# Patient Record
Sex: FEMALE | Race: WHITE | NOT HISPANIC OR LATINO | Employment: UNEMPLOYED | URBAN - METROPOLITAN AREA
[De-identification: names, ages, dates, MRNs, and addresses within clinical notes are randomized per-mention and may not be internally consistent; named-entity substitution may affect disease eponyms.]

---

## 2024-01-17 ENCOUNTER — APPOINTMENT (OUTPATIENT)
Dept: PRIMARY CARE | Facility: CLINIC | Age: 54
End: 2024-01-17
Payer: COMMERCIAL

## 2024-01-18 ENCOUNTER — OFFICE VISIT (OUTPATIENT)
Dept: PRIMARY CARE | Facility: CLINIC | Age: 54
End: 2024-01-18
Payer: COMMERCIAL

## 2024-01-18 VITALS
HEART RATE: 65 BPM | TEMPERATURE: 97 F | BODY MASS INDEX: 42.15 KG/M2 | DIASTOLIC BLOOD PRESSURE: 80 MMHG | HEIGHT: 64 IN | WEIGHT: 246.9 LBS | OXYGEN SATURATION: 98 % | SYSTOLIC BLOOD PRESSURE: 118 MMHG

## 2024-01-18 DIAGNOSIS — I42.8 NON-ISCHEMIC CARDIOMYOPATHY (MULTI): ICD-10-CM

## 2024-01-18 DIAGNOSIS — I47.29 NSVT (NONSUSTAINED VENTRICULAR TACHYCARDIA) (MULTI): ICD-10-CM

## 2024-01-18 DIAGNOSIS — G44.89 HEADACHE SYNDROME: Primary | ICD-10-CM

## 2024-01-18 PROBLEM — D18.01 HEMANGIOMA OF SKIN AND SUBCUTANEOUS TISSUE: Status: ACTIVE | Noted: 2017-09-21

## 2024-01-18 PROBLEM — I47.10 SUPRAVENTRICULAR TACHYCARDIA (CMS-HCC): Status: ACTIVE | Noted: 2018-01-04

## 2024-01-18 PROBLEM — R05.3 PERSISTENT COUGH: Status: RESOLVED | Noted: 2024-01-18 | Resolved: 2024-01-18

## 2024-01-18 PROBLEM — M75.40 IMPINGEMENT SYNDROME, SHOULDER: Status: ACTIVE | Noted: 2024-01-18

## 2024-01-18 PROBLEM — I49.9 IRREGULAR HEARTBEAT: Status: ACTIVE | Noted: 2024-01-18

## 2024-01-18 PROBLEM — I49.3 FREQUENT PVCS: Status: ACTIVE | Noted: 2023-11-08

## 2024-01-18 PROBLEM — M54.2 NECK PAIN, ACUTE: Status: RESOLVED | Noted: 2024-01-18 | Resolved: 2024-01-18

## 2024-01-18 PROBLEM — I49.9 ARRHYTHMIA: Status: RESOLVED | Noted: 2017-12-20 | Resolved: 2024-01-18

## 2024-01-18 PROBLEM — I50.22 CHRONIC SYSTOLIC HEART FAILURE (MULTI): Status: RESOLVED | Noted: 2024-01-18 | Resolved: 2024-01-18

## 2024-01-18 PROBLEM — B00.9 HERPESVIRAL INFECTION, UNSPECIFIED: Status: ACTIVE | Noted: 2017-09-21

## 2024-01-18 PROBLEM — E66.01 MORBID OBESITY (MULTI): Status: ACTIVE | Noted: 2024-01-18
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
## 2024-01-18 PROBLEM — N92.0 MENORRHAGIA: Status: RESOLVED | Noted: 2024-01-18 | Resolved: 2024-01-18

## 2024-01-18 PROBLEM — I50.21 ACUTE SYSTOLIC HEART FAILURE (MULTI): Status: RESOLVED | Noted: 2017-12-20 | Resolved: 2024-01-18

## 2024-01-18 PROBLEM — J34.0 CELLULITIS OF EXTERNAL NOSE: Status: RESOLVED | Noted: 2024-01-18 | Resolved: 2024-01-18

## 2024-01-18 PROBLEM — I51.7 LEFT VENTRICULAR DILATATION: Status: ACTIVE | Noted: 2017-12-20

## 2024-01-18 PROBLEM — L81.4 OTHER MELANIN HYPERPIGMENTATION: Status: ACTIVE | Noted: 2017-09-21

## 2024-01-18 PROBLEM — D23.9 OTHER BENIGN NEOPLASM OF SKIN, UNSPECIFIED: Status: ACTIVE | Noted: 2017-09-21

## 2024-01-18 PROBLEM — I10 BENIGN ESSENTIAL HYPERTENSION: Status: ACTIVE | Noted: 2024-01-18

## 2024-01-18 PROBLEM — R55 NEAR SYNCOPE: Status: RESOLVED | Noted: 2017-12-12 | Resolved: 2024-01-18

## 2024-01-18 PROBLEM — N93.9 ABNORMAL UTERINE BLEEDING (AUB): Status: RESOLVED | Noted: 2024-01-18 | Resolved: 2024-01-18

## 2024-01-18 PROBLEM — S16.1XXA NECK STRAIN: Status: RESOLVED | Noted: 2024-01-18 | Resolved: 2024-01-18

## 2024-01-18 PROCEDURE — 99213 OFFICE O/P EST LOW 20 MIN: CPT | Performed by: INTERNAL MEDICINE

## 2024-01-18 PROCEDURE — 3074F SYST BP LT 130 MM HG: CPT | Performed by: INTERNAL MEDICINE

## 2024-01-18 PROCEDURE — 3079F DIAST BP 80-89 MM HG: CPT | Performed by: INTERNAL MEDICINE

## 2024-01-18 PROCEDURE — 1036F TOBACCO NON-USER: CPT | Performed by: INTERNAL MEDICINE

## 2024-01-18 RX ORDER — NORETHINDRONE 5 MG/1
TABLET ORAL
COMMUNITY
Start: 2023-11-08

## 2024-01-18 RX ORDER — VALACYCLOVIR HYDROCHLORIDE 1 G/1
TABLET, FILM COATED ORAL 2 TIMES DAILY
COMMUNITY
Start: 2019-08-01

## 2024-01-18 RX ORDER — FLUTICASONE PROPIONATE 50 MCG
1 SPRAY, SUSPENSION (ML) NASAL 2 TIMES DAILY
COMMUNITY
Start: 2023-09-12

## 2024-01-18 RX ORDER — MINERAL OIL
180 ENEMA (ML) RECTAL
COMMUNITY
Start: 2023-09-12

## 2024-01-18 RX ORDER — AMITRIPTYLINE HYDROCHLORIDE 10 MG/1
10 TABLET, FILM COATED ORAL NIGHTLY
Qty: 90 TABLET | Refills: 3 | Status: SHIPPED | OUTPATIENT
Start: 2024-01-18 | End: 2025-01-17

## 2024-01-18 RX ORDER — LISINOPRIL 2.5 MG/1
2.5 TABLET ORAL
COMMUNITY
Start: 2022-12-22

## 2024-01-18 RX ORDER — AMITRIPTYLINE HYDROCHLORIDE 10 MG/1
1 TABLET, FILM COATED ORAL NIGHTLY
COMMUNITY
Start: 2020-11-09 | End: 2024-01-18 | Stop reason: SDUPTHER

## 2024-01-18 RX ORDER — METOPROLOL SUCCINATE 100 MG/1
100 TABLET, EXTENDED RELEASE ORAL
COMMUNITY
Start: 2022-12-22

## 2024-01-18 ASSESSMENT — ENCOUNTER SYMPTOMS
SHORTNESS OF BREATH: 0
DIARRHEA: 0
HEADACHES: 1
ABDOMINAL PAIN: 0
COUGH: 0
CONFUSION: 0
FEVER: 0
CHILLS: 0
CONSTIPATION: 0
LIGHT-HEADEDNESS: 0
DIZZINESS: 0
BACK PAIN: 0
ARTHRALGIAS: 0
VOMITING: 0
NAUSEA: 0
FATIGUE: 0

## 2024-01-18 ASSESSMENT — PATIENT HEALTH QUESTIONNAIRE - PHQ9
SUM OF ALL RESPONSES TO PHQ9 QUESTIONS 1 AND 2: 0
1. LITTLE INTEREST OR PLEASURE IN DOING THINGS: NOT AT ALL
2. FEELING DOWN, DEPRESSED OR HOPELESS: NOT AT ALL

## 2024-01-18 NOTE — PROGRESS NOTES
"Subjective   Patient ID: Erickson Hollins is a 53 y.o. female who presents for Headache.    HPI     History reviewed and updated.  Follows with cardiologist regularly - labs through cardiologist.  Sees GYN for mammogram.  Needs refill today on amitriptyline, helps with her headaches.     Review of Systems   Constitutional:  Negative for chills, fatigue and fever.   Respiratory:  Negative for cough and shortness of breath.    Cardiovascular:  Negative for chest pain and leg swelling.   Gastrointestinal:  Negative for abdominal pain, constipation, diarrhea, nausea and vomiting.   Musculoskeletal:  Negative for arthralgias, back pain and gait problem.   Skin:  Negative for rash.   Neurological:  Positive for headaches. Negative for dizziness and light-headedness.   Psychiatric/Behavioral:  Negative for confusion.        Objective   /80   Pulse 65   Temp 36.1 °C (97 °F)   Ht 1.626 m (5' 4\")   Wt 112 kg (246 lb 14.4 oz)   SpO2 98%   BMI 42.38 kg/m²     Physical Exam  Constitutional:       Appearance: Normal appearance. She is obese.   HENT:      Head: Normocephalic and atraumatic.   Cardiovascular:      Rate and Rhythm: Normal rate and regular rhythm.      Heart sounds: No murmur heard.  Pulmonary:      Effort: Pulmonary effort is normal. No respiratory distress.      Breath sounds: Normal breath sounds. No wheezing.   Musculoskeletal:      Right lower leg: No edema.      Left lower leg: No edema.   Skin:     Findings: No rash.   Neurological:      General: No focal deficit present.      Mental Status: She is alert and oriented to person, place, and time. Mental status is at baseline.   Psychiatric:         Mood and Affect: Mood normal.         Behavior: Behavior normal.         Thought Content: Thought content normal.         Judgment: Judgment normal.         Assessment/Plan   Problem List Items Addressed This Visit             ICD-10-CM    Headache syndrome - Primary G44.89    Relevant Medications    " amitriptyline (Elavil) 10 mg tablet    Non-ischemic cardiomyopathy (CMS/HCC) I42.8    Relevant Medications    metoprolol succinate XL (Toprol-XL) 100 mg 24 hr tablet    NSVT (nonsustained ventricular tachycardia) (CMS/HCC) I47.29    Relevant Medications    metoprolol succinate XL (Toprol-XL) 100 mg 24 hr tablet     Headache syndrome - doing well on amitriptyline, continue at same dose, Rx sent.    Nonischemic cardiomyopathy, hx NSVT - follows regularly with cardiologist.    Patient states up to date with mammogram, eye exams, dental exams.    Follow-up annually and PRN.

## 2024-10-14 DIAGNOSIS — G44.89 HEADACHE SYNDROME: ICD-10-CM

## 2024-10-15 RX ORDER — AMITRIPTYLINE HYDROCHLORIDE 10 MG/1
10 TABLET, FILM COATED ORAL NIGHTLY
Qty: 90 TABLET | Refills: 3 | Status: SHIPPED | OUTPATIENT
Start: 2024-10-15

## 2025-01-16 ENCOUNTER — APPOINTMENT (OUTPATIENT)
Dept: PRIMARY CARE | Facility: CLINIC | Age: 55
End: 2025-01-16
Payer: COMMERCIAL

## 2025-01-16 VITALS
WEIGHT: 241.2 LBS | SYSTOLIC BLOOD PRESSURE: 109 MMHG | DIASTOLIC BLOOD PRESSURE: 76 MMHG | TEMPERATURE: 97.1 F | HEART RATE: 76 BPM | HEIGHT: 64 IN | BODY MASS INDEX: 41.18 KG/M2

## 2025-01-16 DIAGNOSIS — G44.89 HEADACHE SYNDROME: Primary | ICD-10-CM

## 2025-01-16 DIAGNOSIS — I47.10 SUPRAVENTRICULAR TACHYCARDIA (CMS-HCC): ICD-10-CM

## 2025-01-16 DIAGNOSIS — J34.89 RHINORRHEA: ICD-10-CM

## 2025-01-16 DIAGNOSIS — I42.8 NON-ISCHEMIC CARDIOMYOPATHY (MULTI): ICD-10-CM

## 2025-01-16 PROCEDURE — 3074F SYST BP LT 130 MM HG: CPT | Performed by: INTERNAL MEDICINE

## 2025-01-16 PROCEDURE — 3008F BODY MASS INDEX DOCD: CPT | Performed by: INTERNAL MEDICINE

## 2025-01-16 PROCEDURE — 99213 OFFICE O/P EST LOW 20 MIN: CPT | Performed by: INTERNAL MEDICINE

## 2025-01-16 PROCEDURE — 3078F DIAST BP <80 MM HG: CPT | Performed by: INTERNAL MEDICINE

## 2025-01-16 PROCEDURE — 1036F TOBACCO NON-USER: CPT | Performed by: INTERNAL MEDICINE

## 2025-01-16 RX ORDER — AMITRIPTYLINE HYDROCHLORIDE 10 MG/1
10 TABLET, FILM COATED ORAL NIGHTLY
Qty: 90 TABLET | Refills: 3 | Status: SHIPPED | OUTPATIENT
Start: 2025-01-16

## 2025-01-16 RX ORDER — ASPIRIN 325 MG
TABLET, DELAYED RELEASE (ENTERIC COATED) ORAL
COMMUNITY
Start: 2024-09-02

## 2025-01-16 ASSESSMENT — ENCOUNTER SYMPTOMS
CONFUSION: 0
LIGHT-HEADEDNESS: 0
COUGH: 0
FATIGUE: 0
DIZZINESS: 0
VOMITING: 0
RHINORRHEA: 1
NAUSEA: 0
CHILLS: 0
ARTHRALGIAS: 0
SHORTNESS OF BREATH: 0
BACK PAIN: 0
FEVER: 0
ABDOMINAL PAIN: 0
CONSTIPATION: 0
HEADACHES: 1
DIARRHEA: 0

## 2025-01-16 ASSESSMENT — PATIENT HEALTH QUESTIONNAIRE - PHQ9
SUM OF ALL RESPONSES TO PHQ9 QUESTIONS 1 AND 2: 0
2. FEELING DOWN, DEPRESSED OR HOPELESS: NOT AT ALL
1. LITTLE INTEREST OR PLEASURE IN DOING THINGS: NOT AT ALL

## 2025-01-16 NOTE — PROGRESS NOTES
CHIEF COMPLAINT  Headache    HISTORY OF PRESENT ILLNESS  Erickson Hollins is a 54 y.o. female presents today for follow up of Headache    HPI    History reviewed and updated.  Needs refill on amitriptyline for headaches - continues to help.  Sees her cardiologist annually - he orders labs for her.  Only other change is chronic rhinorrhea over the last 6 months or so.   No congestion, sneezing, itching.    REVIEW OF SYSTEMS  Review of Systems   Constitutional:  Negative for chills, fatigue and fever.   HENT:  Positive for rhinorrhea.    Respiratory:  Negative for cough and shortness of breath.    Cardiovascular:  Negative for chest pain and leg swelling.   Gastrointestinal:  Negative for abdominal pain, constipation, diarrhea, nausea and vomiting.   Musculoskeletal:  Negative for arthralgias, back pain and gait problem.   Skin:  Negative for rash.   Neurological:  Positive for headaches. Negative for dizziness and light-headedness.   Psychiatric/Behavioral:  Negative for confusion.        ALLERGIES  Patient has no known allergies.    MEDICATIONS  Current Outpatient Medications   Medication Instructions    amitriptyline (ELAVIL) 10 mg, oral, Nightly    cholecalciferol (Vitamin D-3) 50,000 unit capsule TAKE 1 CAPSULE BY MOUTH ON A MONDAY FOR 12 WEEKS    fluticasone (Flonase) 50 mcg/actuation nasal spray 1 spray, 2 times daily    lisinopril 2.5 mg    metoprolol succinate XL (TOPROL-XL) 100 mg    norethindrone (Aygestin) 5 mg tablet 1 tabs, ORAL, DAILY, 90 tabs, 90, Date: 11/8/23 8:50:00 AM EST, [facility], 1 tabs ORAL DAILY    valACYclovir (Valtrex) 1 gram tablet 2 times daily       TOBACCO USE  Social History     Tobacco Use   Smoking Status Never   Smokeless Tobacco Never       DEPRESSION SCREEN  Over the past 2 weeks, how often have you been bothered by any of the following problems?  Little interest or pleasure in doing things: Not at all  Feeling down, depressed, or hopeless: Not at all    SURGICAL  "HISTORY  Past Surgical History:  01/27/2021: OTHER SURGICAL HISTORY      Comment:  Complete colonoscopy       OBJECTIVE    /76   Pulse 76   Temp 36.2 °C (97.1 °F)   Ht 1.626 m (5' 4\")   Wt 109 kg (241 lb 3.2 oz)   BMI 41.40 kg/m²    BMI: Estimated body mass index is 41.4 kg/m² as calculated from the following:    Height as of this encounter: 1.626 m (5' 4\").    Weight as of this encounter: 109 kg (241 lb 3.2 oz).    BP Readings from Last 3 Encounters:   01/16/25 109/76   06/19/24 107/70   01/18/24 118/80      Wt Readings from Last 3 Encounters:   01/16/25 109 kg (241 lb 3.2 oz)   06/19/24 113 kg (248 lb 0.3 oz)   01/18/24 112 kg (246 lb 14.4 oz)        PHYSICAL EXAM  Physical Exam  Constitutional:       Appearance: Normal appearance. She is obese.   HENT:      Head: Normocephalic and atraumatic.   Cardiovascular:      Rate and Rhythm: Normal rate and regular rhythm.      Heart sounds: No murmur heard.  Pulmonary:      Effort: Pulmonary effort is normal. No respiratory distress.      Breath sounds: Normal breath sounds. No wheezing.   Abdominal:      General: There is no distension.      Palpations: Abdomen is soft.      Tenderness: There is no abdominal tenderness.   Musculoskeletal:      Right lower leg: No edema.      Left lower leg: No edema.   Skin:     Findings: No rash.   Neurological:      General: No focal deficit present.      Mental Status: She is alert and oriented to person, place, and time. Mental status is at baseline.   Psychiatric:         Mood and Affect: Mood normal.         Behavior: Behavior normal.         Thought Content: Thought content normal.         Judgment: Judgment normal.          ASSESSMENT AND PLAN  Assessment/Plan   Problem List Items Addressed This Visit       Headache syndrome - Primary    Relevant Medications    amitriptyline (Elavil) 10 mg tablet    Non-ischemic cardiomyopathy (Multi)    Overview     Managed by Dr. Hillman         Supraventricular tachycardia " (CMS-[facility])    Overview     Managed by Dr. Hillman         Rhinorrhea       Headache syndrome - doing well on amitriptyline, continue at same dose, Rx sent.    Rhinorrhea - clear runny nose for 6 months.  Advised to try Astepro OTC.  Can also try antihistamine such as Claritin.  If no improvement, she can contact me to try ipratropium spray.      Patient states up to date with mammogram, eye exams, dental exams.     Follow-up annually.

## 2026-01-19 ENCOUNTER — APPOINTMENT (OUTPATIENT)
Dept: PRIMARY CARE | Facility: CLINIC | Age: 56
End: 2026-01-19
Payer: COMMERCIAL